# Patient Record
Sex: FEMALE | Race: WHITE | NOT HISPANIC OR LATINO | Employment: FULL TIME | URBAN - METROPOLITAN AREA
[De-identification: names, ages, dates, MRNs, and addresses within clinical notes are randomized per-mention and may not be internally consistent; named-entity substitution may affect disease eponyms.]

---

## 2021-02-02 ENCOUNTER — HOSPITAL ENCOUNTER (EMERGENCY)
Facility: CLINIC | Age: 31
Discharge: HOME | End: 2021-02-02
Attending: PHYSICIAN ASSISTANT | Admitting: PHYSICIAN ASSISTANT
Payer: COMMERCIAL

## 2021-02-02 ENCOUNTER — APPOINTMENT (OUTPATIENT)
Dept: ULTRASOUND IMAGING | Facility: CLINIC | Age: 31
End: 2021-02-02
Attending: PHYSICIAN ASSISTANT
Payer: COMMERCIAL

## 2021-02-02 VITALS
DIASTOLIC BLOOD PRESSURE: 80 MMHG | OXYGEN SATURATION: 97 % | HEART RATE: 94 BPM | WEIGHT: 151 LBS | TEMPERATURE: 98.7 F | RESPIRATION RATE: 16 BRPM | SYSTOLIC BLOOD PRESSURE: 115 MMHG

## 2021-02-02 DIAGNOSIS — K29.70 GASTRITIS: ICD-10-CM

## 2021-02-02 DIAGNOSIS — K82.4 GALLBLADDER POLYP: ICD-10-CM

## 2021-02-02 DIAGNOSIS — R10.13 ABDOMINAL PAIN, EPIGASTRIC: ICD-10-CM

## 2021-02-02 LAB
ALBUMIN SERPL-MCNC: 3.8 G/DL (ref 3.4–5)
ALP SERPL-CCNC: 72 U/L (ref 40–150)
ALT SERPL W P-5'-P-CCNC: 27 U/L (ref 0–50)
ANION GAP SERPL CALCULATED.3IONS-SCNC: 5 MMOL/L (ref 3–14)
AST SERPL W P-5'-P-CCNC: 13 U/L (ref 0–45)
BASOPHILS # BLD AUTO: 0 10E9/L (ref 0–0.2)
BASOPHILS NFR BLD AUTO: 0.4 %
BILIRUB SERPL-MCNC: 0.9 MG/DL (ref 0.2–1.3)
BUN SERPL-MCNC: 12 MG/DL (ref 7–30)
CALCIUM SERPL-MCNC: 9.2 MG/DL (ref 8.5–10.1)
CHLORIDE SERPL-SCNC: 108 MMOL/L (ref 94–109)
CO2 SERPL-SCNC: 28 MMOL/L (ref 20–32)
CREAT SERPL-MCNC: 0.76 MG/DL (ref 0.52–1.04)
DIFFERENTIAL METHOD BLD: NORMAL
EOSINOPHIL NFR BLD AUTO: 1.1 %
ERYTHROCYTE [DISTWIDTH] IN BLOOD BY AUTOMATED COUNT: 12.6 % (ref 10–15)
GFR SERPL CREATININE-BSD FRML MDRD: >90 ML/MIN/{1.73_M2}
GLUCOSE SERPL-MCNC: 79 MG/DL (ref 70–99)
HCT VFR BLD AUTO: 42 % (ref 35–47)
HGB BLD-MCNC: 14.4 G/DL (ref 11.7–15.7)
IMM GRANULOCYTES # BLD: 0 10E9/L (ref 0–0.4)
IMM GRANULOCYTES NFR BLD: 0.3 %
LIPASE SERPL-CCNC: 56 U/L (ref 73–393)
LYMPHOCYTES # BLD AUTO: 2 10E9/L (ref 0.8–5.3)
LYMPHOCYTES NFR BLD AUTO: 19 %
MCH RBC QN AUTO: 31.1 PG (ref 26.5–33)
MCHC RBC AUTO-ENTMCNC: 34.3 G/DL (ref 31.5–36.5)
MCV RBC AUTO: 91 FL (ref 78–100)
MONOCYTES # BLD AUTO: 0.5 10E9/L (ref 0–1.3)
MONOCYTES NFR BLD AUTO: 5.2 %
NEUTROPHILS # BLD AUTO: 7.7 10E9/L (ref 1.6–8.3)
NEUTROPHILS NFR BLD AUTO: 74 %
NRBC # BLD AUTO: 0 10*3/UL
NRBC BLD AUTO-RTO: 0 /100
PLATELET # BLD AUTO: 340 10E9/L (ref 150–450)
POTASSIUM SERPL-SCNC: 3.8 MMOL/L (ref 3.4–5.3)
PROT SERPL-MCNC: 7.8 G/DL (ref 6.8–8.8)
RBC # BLD AUTO: 4.63 10E12/L (ref 3.8–5.2)
SODIUM SERPL-SCNC: 141 MMOL/L (ref 133–144)
TROPONIN I SERPL-MCNC: <0.015 UG/L (ref 0–0.04)
WBC # BLD AUTO: 10.4 10E9/L (ref 4–11)

## 2021-02-02 PROCEDURE — 99284 EMERGENCY DEPT VISIT MOD MDM: CPT | Mod: 25 | Performed by: PHYSICIAN ASSISTANT

## 2021-02-02 PROCEDURE — 85025 COMPLETE CBC W/AUTO DIFF WBC: CPT | Performed by: PHYSICIAN ASSISTANT

## 2021-02-02 PROCEDURE — 83690 ASSAY OF LIPASE: CPT | Performed by: PHYSICIAN ASSISTANT

## 2021-02-02 PROCEDURE — 76705 ECHO EXAM OF ABDOMEN: CPT

## 2021-02-02 PROCEDURE — 80053 COMPREHEN METABOLIC PANEL: CPT | Performed by: PHYSICIAN ASSISTANT

## 2021-02-02 PROCEDURE — 99284 EMERGENCY DEPT VISIT MOD MDM: CPT | Performed by: PHYSICIAN ASSISTANT

## 2021-02-02 PROCEDURE — 36415 COLL VENOUS BLD VENIPUNCTURE: CPT | Performed by: PHYSICIAN ASSISTANT

## 2021-02-02 PROCEDURE — 84484 ASSAY OF TROPONIN QUANT: CPT | Performed by: PHYSICIAN ASSISTANT

## 2021-02-02 RX ORDER — BUPROPION HYDROCHLORIDE 300 MG/1
300 TABLET ORAL AT BEDTIME
COMMUNITY

## 2021-02-02 RX ORDER — ACETAMINOPHEN 500 MG
1000 TABLET ORAL EVERY 6 HOURS PRN
COMMUNITY

## 2021-02-02 RX ORDER — IBUPROFEN 200 MG
400 TABLET ORAL EVERY 4 HOURS PRN
COMMUNITY

## 2021-02-02 RX ORDER — ALBUTEROL SULFATE 90 UG/1
2 AEROSOL, METERED RESPIRATORY (INHALATION) EVERY 6 HOURS
COMMUNITY
End: 2022-06-01

## 2021-02-02 SDOH — HEALTH STABILITY: MENTAL HEALTH: HOW OFTEN DO YOU HAVE A DRINK CONTAINING ALCOHOL?: NEVER

## 2021-02-02 NOTE — ED TRIAGE NOTES
"Here with upper mid-epi gastric pain. That started this past weekend. \"Originally it was only at night but she had pain this morning that continued. She was just seen at [facility] in [place] where she had a normal-EKG and chest xray.  She came here for a further work up. Family history of gallbladder issues.  "

## 2021-02-02 NOTE — ED PROVIDER NOTES
"  History     Chief Complaint   Patient presents with     Abdominal Pain     The history is provided by the patient.     Ina Madera is a 30 year old female with a history of depression, anxiety who presents to the emergency department from the [facility] for further evaluation of her epigastric abdominal pain/discomfort. While at the [facility] she was given a GI cocktail that helped her pain subside completely for about 5 minutes and make her mouth tingle, but her pain returned. The patient reports her pain starting 3 days ago and has worsened today. For the first 2 days her pain only came on at night when going to lay down to go to bed. Today it started when she woke up this morning. It does not radiate from her epigastric area. It is a constant discomfort, but is sharp, tightening pain when it worsens. Currently rates it 5-6 on a scale of 10, but notes it worsens to 7-8 on a scale of 10. It is worsened by sitting or standing for long periods of time. It has associated nausea, lightheadedness, decreased appetite. She notes only eating an egg roll today which did not change her pain. No fever, chills, vomiting, trouble swallowing, urinary symptoms, lower abdominal pain. Ibuprofen and Tylenol have not helped her pain. The patient's last bowel movement was today and was normal, but she states that she \"saw something coming out of the stool, like a tail\". No blood in the stool. She has been on her typical schedule of having a bowel movement every other day. History of IBS. No history of ulcers, but she does have high stress. She does not drink caffeine. Patient is a non smoker. She was on medications for anxiety and depression, but quit these to attempt getting pregnant. Her anxiety and depression worsened and she was \"on the verge of a breakdown\" 4 days ago.     Allergies:  Allergies   Allergen Reactions     Doxycycline Dizziness and Nausea and Vomiting       Problem List:    There are " no active problems to display for this patient.       Past Medical History:    Past Medical History:   Diagnosis Date     Anxiety      Depressive disorder      Uncomplicated asthma        Past Surgical History:    No past surgical history on file.    Family History:    No family history on file.    Social History:  Marital Status:    Social History     Tobacco Use     Smoking status: Never Smoker   Substance Use Topics     Alcohol use: Never     Frequency: Never     Drug use: Never        Medications:         acetaminophen (TYLENOL) 500 MG tablet       albuterol (PROAIR HFA/PROVENTIL HFA/VENTOLIN HFA) 108 (90 Base) MCG/ACT inhaler       buPROPion (WELLBUTRIN XL) 300 MG 24 hr tablet       ibuprofen (ADVIL/MOTRIN) 200 MG tablet       omeprazole (PRILOSEC) 20 MG DR capsule          Review of Systems   All other systems reviewed and are negative.      Physical Exam   BP: 113/85  Pulse: 98  Temp: 98.7  F (37.1  C)  Resp: 14  Weight: 68.5 kg (151 lb)  SpO2: 96 %      Physical Exam  Vitals signs and nursing note reviewed.   Constitutional:       General: She is not in acute distress.     Appearance: She is well-developed. She is not diaphoretic.   HENT:      Head: Normocephalic and atraumatic.      Right Ear: External ear normal.      Left Ear: External ear normal.      Nose: Nose normal. No congestion or rhinorrhea.      Mouth/Throat:      Mouth: Mucous membranes are moist.      Pharynx: Oropharynx is clear. No oropharyngeal exudate or posterior oropharyngeal erythema.   Eyes:      General: No scleral icterus.     Extraocular Movements: Extraocular movements intact.      Conjunctiva/sclera: Conjunctivae normal.      Pupils: Pupils are equal, round, and reactive to light.   Neck:      Musculoskeletal: Normal range of motion and neck supple. No neck rigidity or muscular tenderness.   Cardiovascular:      Rate and Rhythm: Normal rate.      Heart sounds: Normal heart sounds.   Pulmonary:      Effort: Pulmonary effort is  normal. No respiratory distress.      Breath sounds: Normal breath sounds.   Abdominal:      General: Bowel sounds are normal.      Palpations: Abdomen is soft.      Tenderness: There is abdominal tenderness in the epigastric area. There is no guarding or rebound.   Musculoskeletal: Normal range of motion.         General: No deformity or signs of injury.      Right lower leg: No edema.      Left lower leg: No edema.   Skin:     General: Skin is warm and dry.      Coloration: Skin is not pale.      Findings: No rash.   Neurological:      General: No focal deficit present.      Mental Status: She is alert and oriented to person, place, and time. Mental status is at baseline.   Psychiatric:         Behavior: Behavior normal.         Thought Content: Thought content normal.         ED Course        Procedures               Critical Care time:  none               Results for orders placed or performed during the hospital encounter of 02/02/21 (from the past 24 hour(s))   CBC with platelets differential   Result Value Ref Range    WBC 10.4 4.0 - 11.0 10e9/L    RBC Count 4.63 3.8 - 5.2 10e12/L    Hemoglobin 14.4 11.7 - 15.7 g/dL    Hematocrit 42.0 35.0 - 47.0 %    MCV 91 78 - 100 fl    MCH 31.1 26.5 - 33.0 pg    MCHC 34.3 31.5 - 36.5 g/dL    RDW 12.6 10.0 - 15.0 %    Platelet Count 340 150 - 450 10e9/L    Diff Method Automated Method     % Neutrophils 74.0 %    % Lymphocytes 19.0 %    % Monocytes 5.2 %    % Eosinophils 1.1 %    % Basophils 0.4 %    % Immature Granulocytes 0.3 %    Nucleated RBCs 0 0 /100    Absolute Neutrophil 7.7 1.6 - 8.3 10e9/L    Absolute Lymphocytes 2.0 0.8 - 5.3 10e9/L    Absolute Monocytes 0.5 0.0 - 1.3 10e9/L    Absolute Basophils 0.0 0.0 - 0.2 10e9/L    Abs Immature Granulocytes 0.0 0 - 0.4 10e9/L    Absolute Nucleated RBC 0.0    Comprehensive metabolic panel   Result Value Ref Range    Sodium 141 133 - 144 mmol/L    Potassium 3.8 3.4 - 5.3 mmol/L    Chloride 108 94 - 109 mmol/L    Carbon Dioxide  28 20 - 32 mmol/L    Anion Gap 5 3 - 14 mmol/L    Glucose 79 70 - 99 mg/dL    Urea Nitrogen 12 7 - 30 mg/dL    Creatinine 0.76 0.52 - 1.04 mg/dL    GFR Estimate >90 >60 mL/min/[1.73_m2]    GFR Estimate If Black >90 >60 mL/min/[1.73_m2]    Calcium 9.2 8.5 - 10.1 mg/dL    Bilirubin Total 0.9 0.2 - 1.3 mg/dL    Albumin 3.8 3.4 - 5.0 g/dL    Protein Total 7.8 6.8 - 8.8 g/dL    Alkaline Phosphatase 72 40 - 150 U/L    ALT 27 0 - 50 U/L    AST 13 0 - 45 U/L   Lipase   Result Value Ref Range    Lipase 56 (L) 73 - 393 U/L   Troponin I   Result Value Ref Range    Troponin I ES <0.015 0.000 - 0.045 ug/L   US Abdomen Limited (RUQ)    Narrative    ULTRASOUND ABDOMEN LIMITED   2/2/2021 3:57 PM     HISTORY:  Right upper quadrant/epigastric abdominal pain.    COMPARISON: None available.    FINDINGS:    Gallbladder: 5 mm echogenic nonshadowing focus abutting the  gallbladder wall, could represent nonshadowing stone versus  gallbladder polyp. No gallbladder wall thickening or pericholecystic  fluid. Sonographic Velez's sign is negative.    Bile ducts:  CHD is normal diameter.  No intrahepatic biliary  dilatation. The distal portion of the common bile duct is obscured by  overlying bowel gas.    Liver: Demonstrates normal parenchymal echogenicity. No focal hepatic  mass visualized.    Pancreas:  Partially obscured by overlying bowel gas,  but grossly  unremarkable.     Right kidney:  No hydronephrosis or shadowing calculi.    Aorta and IVC:  Not specifically assessed.       Impression    IMPRESSION:  5 mm echogenic nonshadowing focus abutting the  gallbladder wall, could represent gallbladder polyp versus small  nonshadowing stone. No sonographic evidence of acute cholecystitis.    LATONYA ACOSTA MD       Medications - No data to display    Assessments & Plan (with Medical Decision Making)     Abdominal pain, epigastric  Gastritis  Gallbladder polyp   Ina is a 30 year old female who presents with concerns of a three day history  of epigastric abdominal pain. Patient was seen at [facility] in [facility] where she had a normal EKG and CXR and received a GI cocktail that helped her pain subside completely for 5 minutes, but it returned. She was sent to the ED for further evaluation. History includes anxiety, depression and high levels of stress. Patient has not been on her medications for these recently because she has been trying to get pregnant. Vitals are stable upon arrival. Physical exam as noted above.  She has epigastric abdominal discomfort without rebound or guarding.  Remainder the exam is otherwise normal.  Basic blood work was normal and Troponin was within normal limits. Lipase was noted to be low at 56. Abdominal ultrasound was obtained.  This displayed an echogenic focus consistent with a 5 mm gallbladder polyp versus a small nonshadowing stone.  No sonographic evidence of acute cholecystitis.  Discussed with the patient that given her improvement with the GI cocktail and her recent increased stressors, it is likely that she has gastritis.  Doubt PUD given lack of any GI bleeding that she has experienced.  The gallbladder polyp could be contributing to her symptoms, but her symptoms are not necessarily postprandial.  Prior to further work-up and evaluation of this gallbladder polyp, I would recommend an antacid trial.  Start omeprazole 20 mg twice daily for 6 days and then down to once daily.  [place] diet discussed.  Avoid caffeine.  Can use Tums or liquid Maalox for breakthrough symptoms.  Follow-up with PCP in 6-7 days for recheck.  If not improving with this regimen, then a HIDA scan could be performed to confirm if she is having true biliary colic.  ED return instructions reviewed with the patient.  She was in agreement with this plan and was suitable for discharge.     I have reviewed the nursing notes.    I have reviewed the findings, diagnosis, plan and need for follow up with the patient.       Discharge  Medication List as of 2/2/2021  4:55 PM      START taking these medications    Details   omeprazole (PRILOSEC) 20 MG DR capsule 20 mg 2 times per day for 6 days, then once daily.  Take 30 minutes prior to the evening meal., Disp-30 capsule, R-0, E-Prescribe             Final diagnoses:   Abdominal pain, epigastric   Gastritis   Gallbladder polyp       This document serves as a record of services personally performed by Carlos Tejeda PA*. It was created on their behalf by Em Robles, a trained medical scribe. The creation of this record is based on the provider's personal observations and the statements of the patient. This document has been checked and approved by the attending provider.    Note: Chart documentation done in part with Dragon Voice Recognition software. Although reviewed after completion, some word and grammatical errors may remain.    2/2/2021   [facility] EMERGENCY DEPT     Carlos Tejeda PA-C  02/03/21 0001

## 2021-02-02 NOTE — DISCHARGE INSTRUCTIONS
It was a pleasure working with you today!  I hope your condition improves rapidly!     As we discussed, I am concerned that your stomach is inflamed with too much stomach acid causing some symptoms.  That is why the GI cocktail provided you with some relief.  You also had a small gallbladder polyp which occasionally can cause symptoms.  Based on the symptoms you are experiencing, this is less likely.  We need to treat stomach acid first to see if this makes a difference prior to doing further work-up on the gallbladder with what is called a HIDA scan.  Your primary care provider can set this up for you if you do not improve with the antacid therapy.  Please start taking the omeprazole twice daily for the first 6 days and then switch to once daily.  It is best taken 30 minutes prior to a meal.  Try and avoid acidic and spicy foods.  Avoid caffeine.  Eat and drink bland foods.  Avoid fatty foods as well.  If you get breakthrough symptoms, you can use liquid Gaviscon or liquid Maalox to help calm things.

## 2021-02-02 NOTE — LETTER
February 2, 2021      To Whom It May Concern:      Ina Madera was seen in our Emergency Department today, 02/02/21.  I expect her condition to improve over the next few days.  She may return to work when improved.      Sincerely,            Carlos Tejeda PA-C

## 2021-02-12 ENCOUNTER — HOSPITAL ENCOUNTER (EMERGENCY)
Facility: CLINIC | Age: 31
Discharge: HOME | End: 2021-02-12
Attending: FAMILY MEDICINE | Admitting: FAMILY MEDICINE
Payer: COMMERCIAL

## 2021-02-12 ENCOUNTER — APPOINTMENT (OUTPATIENT)
Dept: CT IMAGING | Facility: CLINIC | Age: 31
End: 2021-02-12
Attending: FAMILY MEDICINE
Payer: COMMERCIAL

## 2021-02-12 VITALS
SYSTOLIC BLOOD PRESSURE: 116 MMHG | HEART RATE: 77 BPM | DIASTOLIC BLOOD PRESSURE: 70 MMHG | RESPIRATION RATE: 14 BRPM | BODY MASS INDEX: 28.7 KG/M2 | WEIGHT: 162 LBS | OXYGEN SATURATION: 100 % | TEMPERATURE: 97.8 F

## 2021-02-12 DIAGNOSIS — K29.00 ACUTE GASTRITIS WITHOUT HEMORRHAGE, UNSPECIFIED GASTRITIS TYPE: ICD-10-CM

## 2021-02-12 LAB
ALBUMIN SERPL-MCNC: 3.5 G/DL (ref 3.4–5)
ALBUMIN UR-MCNC: NEGATIVE MG/DL
ALP SERPL-CCNC: 62 U/L (ref 40–150)
ALT SERPL W P-5'-P-CCNC: 20 U/L (ref 0–50)
ANION GAP SERPL CALCULATED.3IONS-SCNC: 5 MMOL/L (ref 3–14)
APPEARANCE UR: ABNORMAL
AST SERPL W P-5'-P-CCNC: 13 U/L (ref 0–45)
BACTERIA #/AREA URNS HPF: ABNORMAL /HPF
BASOPHILS # BLD AUTO: 0 10E9/L (ref 0–0.2)
BASOPHILS NFR BLD AUTO: 0.4 %
BILIRUB DIRECT SERPL-MCNC: 0.1 MG/DL (ref 0–0.2)
BILIRUB SERPL-MCNC: 0.6 MG/DL (ref 0.2–1.3)
BILIRUB UR QL STRIP: NEGATIVE
BUN SERPL-MCNC: 12 MG/DL (ref 7–30)
CALCIUM SERPL-MCNC: 8.8 MG/DL (ref 8.5–10.1)
CHLORIDE SERPL-SCNC: 113 MMOL/L (ref 94–109)
CO2 SERPL-SCNC: 24 MMOL/L (ref 20–32)
COLOR UR AUTO: YELLOW
CREAT SERPL-MCNC: 0.71 MG/DL (ref 0.52–1.04)
DIFFERENTIAL METHOD BLD: NORMAL
EOSINOPHIL NFR BLD AUTO: 0.7 %
ERYTHROCYTE [DISTWIDTH] IN BLOOD BY AUTOMATED COUNT: 12.5 % (ref 10–15)
GFR SERPL CREATININE-BSD FRML MDRD: >90 ML/MIN/{1.73_M2}
GLUCOSE SERPL-MCNC: 94 MG/DL (ref 70–99)
GLUCOSE UR STRIP-MCNC: NEGATIVE MG/DL
HCG UR QL: NEGATIVE
HCT VFR BLD AUTO: 39.7 % (ref 35–47)
HGB BLD-MCNC: 13.5 G/DL (ref 11.7–15.7)
HGB UR QL STRIP: NEGATIVE
IMM GRANULOCYTES # BLD: 0 10E9/L (ref 0–0.4)
IMM GRANULOCYTES NFR BLD: 0.6 %
KETONES UR STRIP-MCNC: NEGATIVE MG/DL
LEUKOCYTE ESTERASE UR QL STRIP: ABNORMAL
LIPASE SERPL-CCNC: 69 U/L (ref 73–393)
LYMPHOCYTES # BLD AUTO: 1.9 10E9/L (ref 0.8–5.3)
LYMPHOCYTES NFR BLD AUTO: 27.5 %
MCH RBC QN AUTO: 31.4 PG (ref 26.5–33)
MCHC RBC AUTO-ENTMCNC: 34 G/DL (ref 31.5–36.5)
MCV RBC AUTO: 92 FL (ref 78–100)
MONOCYTES # BLD AUTO: 0.5 10E9/L (ref 0–1.3)
MONOCYTES NFR BLD AUTO: 7.4 %
MUCOUS THREADS #/AREA URNS LPF: PRESENT /LPF
NEUTROPHILS # BLD AUTO: 4.4 10E9/L (ref 1.6–8.3)
NEUTROPHILS NFR BLD AUTO: 63.4 %
NITRATE UR QL: NEGATIVE
NRBC # BLD AUTO: 0 10*3/UL
NRBC BLD AUTO-RTO: 0 /100
PH UR STRIP: 6 PH (ref 5–7)
PLATELET # BLD AUTO: 312 10E9/L (ref 150–450)
POTASSIUM SERPL-SCNC: 3.9 MMOL/L (ref 3.4–5.3)
PROT SERPL-MCNC: 6.8 G/DL (ref 6.8–8.8)
RBC # BLD AUTO: 4.3 10E12/L (ref 3.8–5.2)
RBC #/AREA URNS AUTO: 1 /HPF (ref 0–2)
SODIUM SERPL-SCNC: 142 MMOL/L (ref 133–144)
SOURCE: ABNORMAL
SP GR UR STRIP: 1.02 (ref 1–1.03)
SQUAMOUS #/AREA URNS AUTO: 4 /HPF (ref 0–1)
UROBILINOGEN UR STRIP-MCNC: 0 MG/DL (ref 0–2)
WBC # BLD AUTO: 7 10E9/L (ref 4–11)
WBC #/AREA URNS AUTO: 2 /HPF (ref 0–5)

## 2021-02-12 PROCEDURE — 74177 CT ABD & PELVIS W/CONTRAST: CPT

## 2021-02-12 PROCEDURE — 81001 URINALYSIS AUTO W/SCOPE: CPT | Performed by: FAMILY MEDICINE

## 2021-02-12 PROCEDURE — 80076 HEPATIC FUNCTION PANEL: CPT | Performed by: FAMILY MEDICINE

## 2021-02-12 PROCEDURE — 83690 ASSAY OF LIPASE: CPT | Performed by: FAMILY MEDICINE

## 2021-02-12 PROCEDURE — 250N000009 HC RX 250: Performed by: FAMILY MEDICINE

## 2021-02-12 PROCEDURE — 250N000011 HC RX IP 250 OP 636: Performed by: FAMILY MEDICINE

## 2021-02-12 PROCEDURE — 80048 BASIC METABOLIC PNL TOTAL CA: CPT | Performed by: FAMILY MEDICINE

## 2021-02-12 PROCEDURE — 99285 EMERGENCY DEPT VISIT HI MDM: CPT | Mod: 25 | Performed by: FAMILY MEDICINE

## 2021-02-12 PROCEDURE — 85025 COMPLETE CBC W/AUTO DIFF WBC: CPT | Performed by: FAMILY MEDICINE

## 2021-02-12 PROCEDURE — 250N000013 HC RX MED GY IP 250 OP 250 PS 637: Performed by: FAMILY MEDICINE

## 2021-02-12 PROCEDURE — 81025 URINE PREGNANCY TEST: CPT | Performed by: FAMILY MEDICINE

## 2021-02-12 PROCEDURE — 99284 EMERGENCY DEPT VISIT MOD MDM: CPT | Performed by: FAMILY MEDICINE

## 2021-02-12 RX ORDER — IOPAMIDOL 755 MG/ML
500 INJECTION, SOLUTION INTRAVASCULAR ONCE
Status: COMPLETED | OUTPATIENT
Start: 2021-02-12 | End: 2021-02-12

## 2021-02-12 RX ORDER — HYDROCODONE BITARTRATE AND ACETAMINOPHEN 5; 325 MG/1; MG/1
1 TABLET ORAL EVERY 6 HOURS PRN
Qty: 10 TABLET | Refills: 0 | Status: SHIPPED | OUTPATIENT
Start: 2021-02-12 | End: 2021-02-12

## 2021-02-12 RX ORDER — OXYCODONE AND ACETAMINOPHEN 5; 325 MG/1; MG/1
1 TABLET ORAL EVERY 8 HOURS PRN
Qty: 10 TABLET | Refills: 0 | Status: SHIPPED | OUTPATIENT
Start: 2021-02-12

## 2021-02-12 RX ADMIN — IOPAMIDOL 80 ML: 755 INJECTION, SOLUTION INTRAVENOUS at 10:24

## 2021-02-12 RX ADMIN — SODIUM CHLORIDE 60 ML: 9 INJECTION, SOLUTION INTRAVENOUS at 10:23

## 2021-02-12 RX ADMIN — LIDOCAINE HYDROCHLORIDE 30 ML: 20 SOLUTION ORAL; TOPICAL at 09:50
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
## 2021-02-12 NOTE — ED PROVIDER NOTES
History     Chief Complaint   Patient presents with     Abdominal Pain     HPI  Ina Madera is a 30 year old female who presents with continued abdominal pain this been going on for the past 10+ days.  Patient states she is continuing to have epigastric and upper abdominal pain.  Patient was seen here about 10 days ago for this problem.  Patient had an ultrasound that showed a questionable polyp or stone.  Patient had normal labs at that time.  Patient followed up with her primary care doctor and then they ordered a HIDA scan which was done 2 days ago and this was normal.  Patient was also see a general surgeon but this appointment got moved back and she comes back in because she is concerned about the continued pain.  She states that whenever she eats that makes the pain worse, she takes a deep breath makes the pain worse.  Denies any shortness of breath or chest pain.  She feels like whenever she eats things are running through her but she is only having 1 loose stool per day.  Denies any blood or melena.  Denies any recent dysuria or hematuria. Pt has had previous appendectomy, no other previous surgeries    Allergies:  Allergies   Allergen Reactions     Doxycycline Dizziness and Nausea and Vomiting       Problem List:    Patient Active Problem List    Diagnosis Date Noted     Behavior disturbance 03/22/2013     Priority: Medium        Past Medical History:    Past Medical History:   Diagnosis Date     Anxiety      Depressive disorder      Uncomplicated asthma        Past Surgical History:    History reviewed. No pertinent surgical history.    Family History:    No family history on file.    Social History:  Marital Status:   [2]  Social History     Tobacco Use     Smoking status: Never Smoker   Substance Use Topics     Alcohol use: Never     Frequency: Never     Drug use: Never        Medications:         acetaminophen (TYLENOL) 500 MG tablet       albuterol (PROAIR HFA/PROVENTIL HFA/VENTOLIN HFA) 108  (90 Base) MCG/ACT inhaler       buPROPion (WELLBUTRIN XL) 300 MG 24 hr tablet       estradiol cypionate (DEPO-ESTRADIOL) 5 MG/ML injection       hydrOXYzine (ATARAX) 25 MG tablet       ibuprofen (ADVIL/MOTRIN) 200 MG tablet       multivitamin, therapeutic with minerals (MULTI-VITAMIN) TABS       omeprazole (PRILOSEC) 20 MG DR capsule       PARoxetine (PAXIL) 20 MG tablet          Review of Systems   All other systems reviewed and are negative.      Physical Exam   BP: 134/81  Pulse: 77  Temp: 97.8  F (36.6  C)  Resp: 14  Weight: 73.5 kg (162 lb)  SpO2: 100 %      Physical Exam  Vitals signs and nursing note reviewed.   Constitutional:       General: She is not in acute distress.     Appearance: She is well-developed. She is not diaphoretic.   HENT:      Head: Normocephalic and atraumatic.      Nose: Nose normal.      Mouth/Throat:      Pharynx: No oropharyngeal exudate.   Eyes:      Conjunctiva/sclera: Conjunctivae normal.   Neck:      Musculoskeletal: Normal range of motion and neck supple.   Cardiovascular:      Rate and Rhythm: Normal rate and regular rhythm.      Heart sounds: Normal heart sounds. No murmur. No friction rub.   Pulmonary:      Effort: Pulmonary effort is normal. No respiratory distress.      Breath sounds: Normal breath sounds. No stridor. No wheezing or rales.   Abdominal:      General: Bowel sounds are normal. There is no distension.      Palpations: Abdomen is soft. There is no mass.      Tenderness: There is abdominal tenderness in the left upper quadrant. There is no guarding.   Musculoskeletal: Normal range of motion.         General: No tenderness.   Skin:     General: Skin is warm and dry.      Capillary Refill: Capillary refill takes less than 2 seconds.      Findings: No erythema.   Neurological:      Mental Status: She is alert and oriented to person, place, and time.   Psychiatric:         Judgment: Judgment normal.         ED Course        Procedures      Results for orders placed or  performed during the hospital encounter of 02/12/21 (from the past 24 hour(s))   CBC with platelets differential   Result Value Ref Range    WBC 7.0 4.0 - 11.0 10e9/L    RBC Count 4.30 3.8 - 5.2 10e12/L    Hemoglobin 13.5 11.7 - 15.7 g/dL    Hematocrit 39.7 35.0 - 47.0 %    MCV 92 78 - 100 fl    MCH 31.4 26.5 - 33.0 pg    MCHC 34.0 31.5 - 36.5 g/dL    RDW 12.5 10.0 - 15.0 %    Platelet Count 312 150 - 450 10e9/L    Diff Method Automated Method     % Neutrophils 63.4 %    % Lymphocytes 27.5 %    % Monocytes 7.4 %    % Eosinophils 0.7 %    % Basophils 0.4 %    % Immature Granulocytes 0.6 %    Nucleated RBCs 0 0 /100    Absolute Neutrophil 4.4 1.6 - 8.3 10e9/L    Absolute Lymphocytes 1.9 0.8 - 5.3 10e9/L    Absolute Monocytes 0.5 0.0 - 1.3 10e9/L    Absolute Basophils 0.0 0.0 - 0.2 10e9/L    Abs Immature Granulocytes 0.0 0 - 0.4 10e9/L    Absolute Nucleated RBC 0.0    Basic metabolic panel   Result Value Ref Range    Sodium 142 133 - 144 mmol/L    Potassium 3.9 3.4 - 5.3 mmol/L    Chloride 113 (H) 94 - 109 mmol/L    Carbon Dioxide 24 20 - 32 mmol/L    Anion Gap 5 3 - 14 mmol/L    Glucose 94 70 - 99 mg/dL    Urea Nitrogen 12 7 - 30 mg/dL    Creatinine 0.71 0.52 - 1.04 mg/dL    GFR Estimate >90 >60 mL/min/[1.73_m2]    GFR Estimate If Black >90 >60 mL/min/[1.73_m2]    Calcium 8.8 8.5 - 10.1 mg/dL   Lipase   Result Value Ref Range    Lipase 69 (L) 73 - 393 U/L   Hepatic panel   Result Value Ref Range    Bilirubin Direct 0.1 0.0 - 0.2 mg/dL    Bilirubin Total 0.6 0.2 - 1.3 mg/dL    Albumin 3.5 3.4 - 5.0 g/dL    Protein Total 6.8 6.8 - 8.8 g/dL    Alkaline Phosphatase 62 40 - 150 U/L    ALT 20 0 - 50 U/L    AST 13 0 - 45 U/L   UA reflex to Microscopic and Culture    Specimen: Unspecified Urine   Result Value Ref Range    Color Urine Yellow     Appearance Urine Slightly Cloudy     Glucose Urine Negative NEG^Negative mg/dL    Bilirubin Urine Negative NEG^Negative    Ketones Urine Negative NEG^Negative mg/dL    Specific Gravity  Urine 1.017 1.003 - 1.035    Blood Urine Negative NEG^Negative    pH Urine 6.0 5.0 - 7.0 pH    Protein Albumin Urine Negative NEG^Negative mg/dL    Urobilinogen mg/dL 0.0 0.0 - 2.0 mg/dL    Nitrite Urine Negative NEG^Negative    Leukocyte Esterase Urine Trace (A) NEG^Negative    Source Unspecified Urine     RBC Urine 1 0 - 2 /HPF    WBC Urine 2 0 - 5 /HPF    Bacteria Urine Few (A) NEG^Negative /HPF    Squamous Epithelial /HPF Urine 4 (H) 0 - 1 /HPF    Mucous Urine Present (A) NEG^Negative /LPF   HCG qualitative urine (UPT)   Result Value Ref Range    HCG Qual Urine Negative NEG^Negative   CT Abdomen Pelvis w Contrast    Narrative    CT ABDOMEN PELVIS W CONTRAST 2/12/2021 10:30 AM    CLINICAL HISTORY: Abdominal pain    TECHNIQUE: CT scan of the abdomen and pelvis was performed following  injection of IV contrast. Multiplanar reformats were obtained. Dose  reduction techniques were used.  CONTRAST: 80 mL Isovue 370     COMPARISON: Ultrasound 2/2/2021    FINDINGS:   LOWER CHEST: Normal.    HEPATOBILIARY: Normal.    PANCREAS: Normal.    SPLEEN: Normal.    ADRENAL GLANDS: Normal.    KIDNEYS/BLADDER: Normal.    BOWEL: Normal.    PELVIC ORGANS: Normal.    ADDITIONAL FINDINGS: None.    MUSCULOSKELETAL: Normal.      Impression    IMPRESSION:   1.  Normal CT of the abdomen and pelvis.    NAA BAEZA MD       Medications   lidocaine (XYLOCAINE) 2 % 15 mL, alum & mag hydroxide-simethicone (MAALOX) 15 mL GI Cocktail (has no administration in time range)     Patient did not get much relief from the GI cocktail this time compared to the other visits.  Work-up once again is completely normal.  I did a CT scan of the belly since so far we have not found an exact cause for her abdominal pain but the CT scan was normal here.  I think between the ultrasound, HIDA scan and now the CT scan and normal repeat blood testing, we ruled out any emergent medical condition.  It does still seem like this is most likely gastritis.  I want her  to continue on the Prilosec that was started for her, she will still work on diet changes.  She has an appoint with general surgery next week and she can keep this appointment to discuss getting an outpatient endoscopy set up.  Patient will be sent home with a few pain pills that I encourage her just use at night if she needs it to help her sleep.  Patient will otherwise be discharged at this time.      Assessments & Plan (with Medical Decision Making)  Gastritis     I have reviewed the nursing notes.    I have reviewed the findings, diagnosis, plan and need for follow up with the patient.              2/12/2021   [facility] EMERGENCY DEPT     Demetrius Chew MD  02/12/21 1049

## 2022-01-23 ENCOUNTER — HEALTH MAINTENANCE LETTER (OUTPATIENT)
Age: 32
End: 2022-01-23

## 2022-06-01 ENCOUNTER — APPOINTMENT (OUTPATIENT)
Dept: CT IMAGING | Facility: CLINIC | Age: 32
End: 2022-06-01
Attending: PHYSICIAN ASSISTANT
Payer: COMMERCIAL

## 2022-06-01 ENCOUNTER — APPOINTMENT (OUTPATIENT)
Dept: ULTRASOUND IMAGING | Facility: CLINIC | Age: 32
End: 2022-06-01
Attending: PHYSICIAN ASSISTANT
Payer: COMMERCIAL

## 2022-06-01 ENCOUNTER — HOSPITAL ENCOUNTER (EMERGENCY)
Facility: CLINIC | Age: 32
Discharge: HOME | End: 2022-06-01
Attending: PHYSICIAN ASSISTANT | Admitting: PHYSICIAN ASSISTANT
Payer: COMMERCIAL

## 2022-06-01 VITALS
DIASTOLIC BLOOD PRESSURE: 69 MMHG | TEMPERATURE: 99.2 F | HEART RATE: 98 BPM | RESPIRATION RATE: 16 BRPM | OXYGEN SATURATION: 99 % | WEIGHT: 142.5 LBS | SYSTOLIC BLOOD PRESSURE: 102 MMHG | BODY MASS INDEX: 25.24 KG/M2

## 2022-06-01 DIAGNOSIS — R79.89 ELEVATED LFTS: ICD-10-CM

## 2022-06-01 DIAGNOSIS — R51.9 THROBBING HEADACHE: ICD-10-CM

## 2022-06-01 LAB
ALBUMIN SERPL-MCNC: 4.3 G/DL (ref 3.4–5)
ALBUMIN UR-MCNC: NEGATIVE MG/DL
ALP SERPL-CCNC: 169 U/L (ref 40–150)
ALT SERPL W P-5'-P-CCNC: 214 U/L (ref 0–50)
ANION GAP SERPL CALCULATED.3IONS-SCNC: 4 MMOL/L (ref 3–14)
APPEARANCE UR: CLEAR
AST SERPL W P-5'-P-CCNC: 62 U/L (ref 0–45)
BACTERIA #/AREA URNS HPF: ABNORMAL /HPF
BASOPHILS # BLD AUTO: 0 10E3/UL (ref 0–0.2)
BASOPHILS NFR BLD AUTO: 0 %
BILIRUB SERPL-MCNC: 1 MG/DL (ref 0.2–1.3)
BILIRUB UR QL STRIP: NEGATIVE
BUN SERPL-MCNC: 17 MG/DL (ref 7–30)
CALCIUM SERPL-MCNC: 9.5 MG/DL (ref 8.5–10.1)
CHLORIDE BLD-SCNC: 109 MMOL/L (ref 94–109)
CO2 SERPL-SCNC: 30 MMOL/L (ref 20–32)
COLOR UR AUTO: COLORLESS
CREAT SERPL-MCNC: 0.72 MG/DL (ref 0.52–1.04)
EOSINOPHIL # BLD AUTO: 0 10E3/UL (ref 0–0.7)
EOSINOPHIL NFR BLD AUTO: 0 %
ERYTHROCYTE [DISTWIDTH] IN BLOOD BY AUTOMATED COUNT: 13.3 % (ref 10–15)
FLUAV RNA SPEC QL NAA+PROBE: NEGATIVE
FLUBV RNA RESP QL NAA+PROBE: NEGATIVE
GFR SERPL CREATININE-BSD FRML MDRD: >90 ML/MIN/1.73M2
GLUCOSE BLD-MCNC: 100 MG/DL (ref 70–99)
GLUCOSE UR STRIP-MCNC: NEGATIVE MG/DL
HCT VFR BLD AUTO: 45.2 % (ref 35–47)
HGB BLD-MCNC: 15.2 G/DL (ref 11.7–15.7)
HGB UR QL STRIP: ABNORMAL
IMM GRANULOCYTES # BLD: 0 10E3/UL
IMM GRANULOCYTES NFR BLD: 0 %
KETONES UR STRIP-MCNC: NEGATIVE MG/DL
LEUKOCYTE ESTERASE UR QL STRIP: NEGATIVE
LYMPHOCYTES # BLD AUTO: 1.8 10E3/UL (ref 0.8–5.3)
LYMPHOCYTES NFR BLD AUTO: 15 %
MCH RBC QN AUTO: 31.1 PG (ref 26.5–33)
MCHC RBC AUTO-ENTMCNC: 33.6 G/DL (ref 31.5–36.5)
MCV RBC AUTO: 93 FL (ref 78–100)
MONOCYTES # BLD AUTO: 0.7 10E3/UL (ref 0–1.3)
MONOCYTES NFR BLD AUTO: 6 %
NEUTROPHILS # BLD AUTO: 9.6 10E3/UL (ref 1.6–8.3)
NEUTROPHILS NFR BLD AUTO: 79 %
NITRATE UR QL: NEGATIVE
NRBC # BLD AUTO: 0 10E3/UL
NRBC BLD AUTO-RTO: 0 /100
PH UR STRIP: 7 [PH] (ref 5–7)
PLATELET # BLD AUTO: 328 10E3/UL (ref 150–450)
POTASSIUM BLD-SCNC: 4 MMOL/L (ref 3.4–5.3)
PROT SERPL-MCNC: 8.2 G/DL (ref 6.8–8.8)
RBC # BLD AUTO: 4.88 10E6/UL (ref 3.8–5.2)
RBC URINE: 0 /HPF
SARS-COV-2 RNA RESP QL NAA+PROBE: NEGATIVE
SODIUM SERPL-SCNC: 143 MMOL/L (ref 133–144)
SP GR UR STRIP: 1 (ref 1–1.03)
UROBILINOGEN UR STRIP-MCNC: NORMAL MG/DL
WBC # BLD AUTO: 12.2 10E3/UL (ref 4–11)
WBC URINE: <1 /HPF

## 2022-06-01 PROCEDURE — 76705 ECHO EXAM OF ABDOMEN: CPT

## 2022-06-01 PROCEDURE — 87636 SARSCOV2 & INF A&B AMP PRB: CPT | Performed by: PHYSICIAN ASSISTANT

## 2022-06-01 PROCEDURE — 70450 CT HEAD/BRAIN W/O DYE: CPT

## 2022-06-01 PROCEDURE — 81001 URINALYSIS AUTO W/SCOPE: CPT | Performed by: EMERGENCY MEDICINE

## 2022-06-01 PROCEDURE — 81001 URINALYSIS AUTO W/SCOPE: CPT | Performed by: PHYSICIAN ASSISTANT

## 2022-06-01 PROCEDURE — 99285 EMERGENCY DEPT VISIT HI MDM: CPT | Mod: 25 | Performed by: PHYSICIAN ASSISTANT

## 2022-06-01 PROCEDURE — C9803 HOPD COVID-19 SPEC COLLECT: HCPCS | Performed by: PHYSICIAN ASSISTANT

## 2022-06-01 PROCEDURE — 36415 COLL VENOUS BLD VENIPUNCTURE: CPT | Performed by: PHYSICIAN ASSISTANT

## 2022-06-01 PROCEDURE — 96375 TX/PRO/DX INJ NEW DRUG ADDON: CPT | Performed by: PHYSICIAN ASSISTANT

## 2022-06-01 PROCEDURE — 258N000003 HC RX IP 258 OP 636: Performed by: PHYSICIAN ASSISTANT

## 2022-06-01 PROCEDURE — 96361 HYDRATE IV INFUSION ADD-ON: CPT | Performed by: PHYSICIAN ASSISTANT

## 2022-06-01 PROCEDURE — 250N000011 HC RX IP 250 OP 636: Performed by: PHYSICIAN ASSISTANT

## 2022-06-01 PROCEDURE — 85048 AUTOMATED LEUKOCYTE COUNT: CPT | Performed by: PHYSICIAN ASSISTANT

## 2022-06-01 PROCEDURE — 96374 THER/PROPH/DIAG INJ IV PUSH: CPT | Performed by: PHYSICIAN ASSISTANT

## 2022-06-01 PROCEDURE — 80053 COMPREHEN METABOLIC PANEL: CPT | Performed by: PHYSICIAN ASSISTANT

## 2022-06-01 RX ORDER — SODIUM CHLORIDE 9 MG/ML
INJECTION, SOLUTION INTRAVENOUS CONTINUOUS
Status: DISCONTINUED | OUTPATIENT
Start: 2022-06-01 | End: 2022-06-01 | Stop reason: HOSPADM

## 2022-06-01 RX ORDER — ALBUTEROL SULFATE 90 UG/1
2 AEROSOL, METERED RESPIRATORY (INHALATION) 4 TIMES DAILY PRN
COMMUNITY
Start: 2022-01-03

## 2022-06-01 RX ORDER — METOCLOPRAMIDE HYDROCHLORIDE 5 MG/ML
10 INJECTION INTRAMUSCULAR; INTRAVENOUS ONCE
Status: COMPLETED | OUTPATIENT
Start: 2022-06-01 | End: 2022-06-01

## 2022-06-01 RX ORDER — DIPHENHYDRAMINE HYDROCHLORIDE 50 MG/ML
25 INJECTION INTRAMUSCULAR; INTRAVENOUS ONCE
Status: COMPLETED | OUTPATIENT
Start: 2022-06-01 | End: 2022-06-01

## 2022-06-01 RX ADMIN — SODIUM CHLORIDE 1000 ML: 9 INJECTION, SOLUTION INTRAVENOUS at 13:07

## 2022-06-01 RX ADMIN — METOCLOPRAMIDE HYDROCHLORIDE 10 MG: 5 INJECTION INTRAMUSCULAR; INTRAVENOUS at 13:11

## 2022-06-01 RX ADMIN — DIPHENHYDRAMINE HYDROCHLORIDE 25 MG: 50 INJECTION, SOLUTION INTRAMUSCULAR; INTRAVENOUS at 13:07

## 2022-06-01 NOTE — DISCHARGE INSTRUCTIONS
Your work-up today was overall reassuring.  Your head scan was negative for anything acute causing your headache.  Question if could be related to dehydration or underlying illness not found on exam or labs today.     I am glad you are feeling better.  If you start to have a headache again, you can take ibuprofen or Tylenol.  Be sure you are drinking lots of fluids.    Your liver enzymes were elevated today so please contact your clinic provider tomorrow to schedule close follow-up to have these re-looked at.    If you do have any worsening symptoms in the meantime please return to the emergency department right away.    Thank you for choosing [facility]'s Emergency Department. It was a pleasure taking care of you today. If you have any questions, please call [phone].    Gilda Velasquez PA-C

## 2022-06-01 NOTE — ED TRIAGE NOTES
Pt presents with severe headache that started this morning. Pt is shaking, low grade fever. Pt mentions she is breast feeding. Nausea.      Triage Assessment     Row Name 06/01/22 1126       Triage Assessment (Adult)    Airway WDL WDL       Respiratory WDL    Respiratory WDL WDL       Skin Circulation/Temperature WDL    Skin Circulation/Temperature WDL WDL       Cardiac WDL    Cardiac WDL WDL       Peripheral/Neurovascular WDL    Peripheral Neurovascular WDL WDL       Cognitive/Neuro/Behavioral WDL    Cognitive/Neuro/Behavioral WDL WDL

## 2022-06-01 NOTE — ED PROVIDER NOTES
History     Chief Complaint   Patient presents with     Headache     Shaking        HPI  Ina Madera is a 31 year old female who presents to the emergency department complaining of a headache. The patient reports this morning around 8:30 AM she developed a diffuse throbbing headache.  She describes it as severe, causing nausea, fogginess of her vision, photosensitivity, and shakiness.  Symptoms persisted throughout the day up until now.  She denies any vomiting.  She has had a little sore throat but no cough or congestion.  She has felt very rundown and more tired than usual for the last couple days.  She is still breast pumping after giving birth about 6 months ago.  She denies any increased breast redness or focal tenderness though nipples have been sore.  She has not had any significant fevers.  No reported numbness or weakness.  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          Allergies:  Allergies   Allergen Reactions     Doxycycline Dizziness and Nausea and Vomiting     Hydrocodone-Acetaminophen Nausea and Vomiting       Problem List:    Patient Active Problem List    Diagnosis Date Noted     Behavior disturbance 03/22/2013     Priority: Medium        Past Medical History:    Past Medical History:   Diagnosis Date     Anxiety      Depressive disorder      Uncomplicated asthma        Past Surgical History:    No past surgical history on file.    Family History:    No family history on file.    Social History:  Marital Status:   [2]  Social History     Tobacco Use     Smoking status: Never Smoker   Substance Use Topics     Alcohol use: Never     Drug use: Never        Medications:    albuterol (PROAIR HFA/PROVENTIL HFA/VENTOLIN HFA) 108 (90 Base) MCG/ACT inhaler  etonogestrel (NEXPLANON) 68 MG IMPL  acetaminophen (TYLENOL) 500 MG tablet  buPROPion (WELLBUTRIN XL) 300 MG 24 hr tablet  estradiol cypionate (DEPO-ESTRADIOL) 5 MG/ML injection  hydrOXYzine (ATARAX) 25 MG  tablet  ibuprofen (ADVIL/MOTRIN) 200 MG tablet  multivitamin, therapeutic with minerals (MULTI-VITAMIN) TABS  omeprazole (PRILOSEC) 20 MG DR capsule  oxyCODONE-acetaminophen (PERCOCET) 5-325 MG tablet  PARoxetine (PAXIL) 20 MG tablet          Review of Systems   All other systems reviewed and are negative.      Physical Exam   BP: 118/80  Pulse: 98  Temp: 99.2  F (37.3  C)  Resp: 16  Weight: 64.6 kg (142 lb 8 oz)  SpO2: 99 %      Physical Exam  Vitals and nursing note reviewed.   Constitutional:       General: She is not in acute distress.     Appearance: Normal appearance. She is well-developed. She is not ill-appearing, toxic-appearing or diaphoretic.   HENT:      Head: Normocephalic and atraumatic.      Nose: Nose normal.      Mouth/Throat:      Mouth: Mucous membranes are moist.      Pharynx: Oropharynx is clear. No oropharyngeal exudate or posterior oropharyngeal erythema.   Eyes:      Extraocular Movements: Extraocular movements intact.      Conjunctiva/sclera: Conjunctivae normal.      Pupils: Pupils are equal, round, and reactive to light.   Cardiovascular:      Rate and Rhythm: Normal rate and regular rhythm.      Heart sounds: Normal heart sounds.   Pulmonary:      Effort: Pulmonary effort is normal. No respiratory distress.      Breath sounds: Normal breath sounds.      Comments: Breasts without significant erythema, focal induration or tenderness noted.  No evidence of infection.  Abdominal:      General: Bowel sounds are normal. There is no distension.      Palpations: Abdomen is soft.      Tenderness: There is abdominal tenderness (mild, RUQ and epigastric).   Musculoskeletal:         General: No deformity.      Cervical back: Neck supple. No rigidity.   Skin:     General: Skin is warm and dry.   Neurological:      General: No focal deficit present.      Mental Status: She is alert and oriented to person, place, and time. Mental status is at baseline.      Cranial Nerves: No cranial nerve deficit.       Motor: No weakness.      Coordination: Coordination normal.   Psychiatric:         Mood and Affect: Mood normal.         Behavior: Behavior normal.         ED Course          Procedures        Results for orders placed or performed during the hospital encounter of 06/01/22 (from the past 24 hour(s))   UA with Microscopic reflex to Culture    Specimen: Urine, Clean Catch   Result Value Ref Range    Color Urine Colorless Colorless, Straw, Light Yellow, Yellow    Appearance Urine Clear Clear    Glucose Urine Negative Negative mg/dL    Bilirubin Urine Negative Negative    Ketones Urine Negative Negative mg/dL    Specific Gravity Urine 1.001 (L) 1.003 - 1.035    Blood Urine Small (A) Negative    pH Urine 7.0 5.0 - 7.0    Protein Albumin Urine Negative Negative mg/dL    Urobilinogen Urine Normal Normal, 2.0 mg/dL    Nitrite Urine Negative Negative    Leukocyte Esterase Urine Negative Negative    Bacteria Urine Few (A) None Seen /HPF    RBC Urine 0 <=2 /HPF    WBC Urine <1 <=5 /HPF    Narrative    Urine Culture not indicated   Symptomatic; Auto-generated order Influenza A/B & SARS-CoV2 (COVID-19) Virus PCR Multiplex Nasopharyngeal    Specimen: Nasopharyngeal; Swab   Result Value Ref Range    Influenza A PCR Negative Negative    Influenza B PCR Negative Negative    SARS CoV2 PCR Negative Negative    Narrative    Testing was performed using the ivy SARS-CoV-2 & Influenza A/B Assay on the ivy [employer] System. This test should be ordered for the detection of SARS-CoV-2 and influenza viruses in individuals who meet clinical and/or epidemiological criteria. Test performance is unknown in asymptomatic patients. This test is for in vitro diagnostic use under the FDA EUA for laboratories certified under CLIA to perform moderate and/or high complexity testing. This test has not been FDA cleared or approved. A negative result does not rule out the presence of PCR inhibitors in the specimen or target RNA in concentration below the  limit of detection for the assay. If only one viral target is positive but coinfection with multiple targets is suspected, the sample should be re-tested with another FDA cleared, approved or authorized test, if coinfection would change clinical management. [facility] are certified under the Clinical Laboratory Improvement Amendments of 1988 (CLIA-88) as  qualified to perform moderate and/or high complexity laboratory testing.   CBC with platelets differential    Narrative    The following orders were created for panel order CBC with platelets differential.  Procedure                               Abnormality         Status                     ---------                               -----------         ------                     CBC with platelets and d...[[id number]]  Abnormal            Final result                 Please view results for these tests on the individual orders.   Comprehensive metabolic panel   Result Value Ref Range    Sodium 143 133 - 144 mmol/L    Potassium 4.0 3.4 - 5.3 mmol/L    Chloride 109 94 - 109 mmol/L    Carbon Dioxide (CO2) 30 20 - 32 mmol/L    Anion Gap 4 3 - 14 mmol/L    Urea Nitrogen 17 7 - 30 mg/dL    Creatinine 0.72 0.52 - 1.04 mg/dL    Calcium 9.5 8.5 - 10.1 mg/dL    Glucose 100 (H) 70 - 99 mg/dL    Alkaline Phosphatase 169 (H) 40 - 150 U/L    AST 62 (H) 0 - 45 U/L     (H) 0 - 50 U/L    Protein Total 8.2 6.8 - 8.8 g/dL    Albumin 4.3 3.4 - 5.0 g/dL    Bilirubin Total 1.0 0.2 - 1.3 mg/dL    GFR Estimate >90 >60 mL/min/1.73m2   CBC with platelets and differential   Result Value Ref Range    WBC Count 12.2 (H) 4.0 - 11.0 10e3/uL    RBC Count 4.88 3.80 - 5.20 10e6/uL    Hemoglobin 15.2 11.7 - 15.7 g/dL    Hematocrit 45.2 35.0 - 47.0 %    MCV 93 78 - 100 fL    MCH 31.1 26.5 - 33.0 pg    MCHC 33.6 31.5 - 36.5 g/dL    RDW 13.3 10.0 - 15.0 %    Platelet Count 328 150 - 450 10e3/uL    % Neutrophils 79 %    % Lymphocytes 15 %    % Monocytes 6 %    % Eosinophils 0 %     % Basophils 0 %    % Immature Granulocytes 0 %    NRBCs per 100 WBC 0 <1 /100    Absolute Neutrophils 9.6 (H) 1.6 - 8.3 10e3/uL    Absolute Lymphocytes 1.8 0.8 - 5.3 10e3/uL    Absolute Monocytes 0.7 0.0 - 1.3 10e3/uL    Absolute Eosinophils 0.0 0.0 - 0.7 10e3/uL    Absolute Basophils 0.0 0.0 - 0.2 10e3/uL    Absolute Immature Granulocytes 0.0 <=0.4 10e3/uL    Absolute NRBCs 0.0 10e3/uL   CT Head w/o Contrast    Narrative    CT HEAD WITHOUT CONTRAST   6/1/2022 2:12 PM     HISTORY: Severe headache in a 31-year-old female.    COMPARISON: None.    TECHNIQUE: Axial images through the brain obtained without intravenous  contrast, reviewed in bone, brain, and subdural windows.  Radiation dose for this scan was reduced using automated exposure  control, adjustment of the mA and/or kV according to patient size, or  iterative reconstruction technique.    FINDINGS: Attenuation of the brain parenchyma is grossly within normal  limits without mass or acute hemorrhage. There is no mass-effect or  midline shift. Gray/white matter differentiation is well maintained.   No abnormal intra- or extra-axial fluid collections. The ventricles do  not appear enlarged out of proportion to the cerebral sulci.    The visualized portions of the paranasal sinuses, mastoid air cells,  calvarium, and orbits are unremarkable.       Impression    IMPRESSION: No acute intracranial pathology.   US Abdomen Limited (RUQ)    Narrative    US ABDOMEN LIMITED 6/1/2022 2:35 PM    CLINICAL HISTORY: abnl LFTs, abd pain, nausea  TECHNIQUE: Limited abdominal ultrasound.    COMPARISON: CT 2/12/2021    FINDINGS:    GALLBLADDER: Mobile cholelithiasis without wall thickening or  pericholecystic fluid. Sonographic Velez sign is negative.    BILE DUCTS: There is no biliary dilatation. The common duct measures 3  mm.    LIVER: Normal where seen.    RIGHT KIDNEY: Extrarenal pelvis without johny hydronephrosis, similar  to prior CT.    PANCREAS: The visualized  "portions of the pancreas are normal.    No ascites.      Impression    IMPRESSION:  1.  Cholelithiasis without evidence of acute cholecystitis or biliary  obstruction.    JEAN PALAFOX MD         SYSTEM ID:  [id number]       Medications   0.9% sodium chloride BOLUS (1,000 mLs Intravenous New Bag 6/1/22 1307)     Followed by   sodium chloride 0.9% infusion (has no administration in time range)   metoclopramide (REGLAN) injection 10 mg (10 mg Intravenous Given 6/1/22 1311)   diphenhydrAMINE (BENADRYL) injection 25 mg (25 mg Intravenous Given 6/1/22 1307)       Assessments & Plan (with Medical Decision Making)  Ina Madera is a 31 year old female who presents to the ED complaining of a throbbing headache that began this morning.  She has associated nausea and shakiness with photosensitivity.  On arrival to the ED she was afebrile with normal vital signs.  She did not appear acutely ill or toxic.  Neurological exam was completely normal today.  She did have very mild tenderness in epigastric and right upper quadrant.  IV was established and labs drawn for further evaluation.  Given the atypical presentation of her headache and she describing it as \"very severe,\" we decided to obtain a head CT to rule out intracranial pathology.  Labs also collected.  Patient given IV fluids, Reglan, and Benadryl here.  CT scan was fortunately negative for any acute abnormalities.  Labs today however showed elevated LFTs with an alkaline phosphatase of 169, ALT of 214, and AST of 62.  Her white count was a little high at 12.2.  Platelets and hemoglobin were normal.  Urinalysis without signs of infection.  COVID and influenza were both negative as well  I discussed results of labs with the patient.  She does have a history of abnormal LFTs in relation to  HELLP syndrome during pregnancy she did have some mild upper abdominal tenderness so we did end up getting an ultrasound to rule out gallbladder pathology.  This showed cholelithiasis " but no evidence of acute cholecystitis or biliary obstruction.  Liver appeared normal on ultrasound.  After medications given here, patient reported feeling a lot better.  I am not finding a clear cause for her severe headache at this time.  Also unclear why her LFTs are elevated today.  She was comfortable with the plan to follow-up regarding these abnormalities with her clinic provider in the next few days, she was advised to call and schedule this at her convenience.  In the meantime, discussed that headache may be secondary to dehydration or underlying illness not discovered today on labs or exam.  She was comfortable with treating headache symptoms conservatively with ibuprofen or Tylenol if it should recur.  I did go over warning signs and symptoms and when she should return to the emergency department.  All questions answered and patient discharged home in suitable condition.     I have reviewed the nursing notes.    I have reviewed the findings, diagnosis, plan and need for follow up with the patient.    New Prescriptions    No medications on file       Final diagnoses:   Throbbing headache   Elevated LFTs     Note: Chart documentation done in part with Dragon Voice Recognition software. Although reviewed after completion, some word and grammatical errors may remain.     6/1/2022   [facility] EMERGENCY DEPT     Gilda Velasquez PA-C  06/01/22 2372

## 2022-09-04 ENCOUNTER — HEALTH MAINTENANCE LETTER (OUTPATIENT)
Age: 32
End: 2022-09-04

## 2023-04-29 ENCOUNTER — HEALTH MAINTENANCE LETTER (OUTPATIENT)
Age: 33
End: 2023-04-29